# Patient Record
Sex: MALE | Race: WHITE | NOT HISPANIC OR LATINO | Employment: FULL TIME | URBAN - METROPOLITAN AREA
[De-identification: names, ages, dates, MRNs, and addresses within clinical notes are randomized per-mention and may not be internally consistent; named-entity substitution may affect disease eponyms.]

---

## 2017-01-31 ENCOUNTER — OFFICE VISIT (OUTPATIENT)
Dept: FAMILY MEDICINE CLINIC | Facility: CLINIC | Age: 37
End: 2017-01-31

## 2017-01-31 VITALS
WEIGHT: 227 LBS | HEIGHT: 72 IN | TEMPERATURE: 97.7 F | DIASTOLIC BLOOD PRESSURE: 100 MMHG | BODY MASS INDEX: 30.75 KG/M2 | OXYGEN SATURATION: 98 % | SYSTOLIC BLOOD PRESSURE: 150 MMHG | HEART RATE: 91 BPM

## 2017-01-31 DIAGNOSIS — F41.9 ANXIETY AND DEPRESSION: Primary | ICD-10-CM

## 2017-01-31 DIAGNOSIS — G47.26 SHIFT WORK SLEEP DISORDER: ICD-10-CM

## 2017-01-31 DIAGNOSIS — F32.A ANXIETY AND DEPRESSION: Primary | ICD-10-CM

## 2017-01-31 PROCEDURE — 99213 OFFICE O/P EST LOW 20 MIN: CPT | Performed by: NURSE PRACTITIONER

## 2017-01-31 RX ORDER — TRAZODONE HYDROCHLORIDE 50 MG/1
50 TABLET ORAL NIGHTLY PRN
Qty: 30 TABLET | Refills: 0 | Status: SHIPPED | OUTPATIENT
Start: 2017-01-31

## 2017-01-31 RX ORDER — CITALOPRAM 20 MG/1
20 TABLET ORAL DAILY
Qty: 30 TABLET | Refills: 0
Start: 2017-01-31

## 2017-01-31 NOTE — PROGRESS NOTES
"Subjective   Mayo Howell Jr. is a 36 y.o. male who is here for a follow up on depression and anxiety.  Patient said he feels like it is getting worse and he isn't sleeping.  Patient also said he has seen his girls (4) for a little over 2 weeks.     History of Present Illness   Was only needing 1 buspar daily until couldn't see his children. Currently living with dad. Struggling with worry, some anger and sadness. No outbursts at work. Worst symptoms when alone. Trying not to isolate self from people, but notes primary supports from dad, friend and children.     Working 14 hrs days (3rd shift) and side work, having EMA, 2-3 hours after sleep onset. Initiation takes 1 hr.   The following portions of the patient's history were reviewed and updated as appropriate: allergies, current medications, past family history, past medical history, past social history, past surgical history and problem list.    Review of Systems   Constitutional: Positive for activity change and fatigue. Negative for fever and unexpected weight change.   HENT: Negative.    Respiratory: Negative.    Cardiovascular: Negative.    Gastrointestinal: Negative.  Negative for abdominal pain.   Endocrine: Negative for cold intolerance, heat intolerance, polydipsia, polyphagia and polyuria.   Neurological: Negative for seizures, light-headedness and headaches.   Psychiatric/Behavioral: Positive for decreased concentration, dysphoric mood and sleep disturbance. Negative for agitation, confusion, hallucinations, self-injury and suicidal ideas. The patient is nervous/anxious. The patient is not hyperactive.      Visit Vitals   • /100   • Pulse 91   • Temp 97.7 °F (36.5 °C) (Oral)   • Ht 72\" (182.9 cm)   • Wt 227 lb (103 kg)   • SpO2 98%   • BMI 30.79 kg/m2     Objective   Physical Exam   Constitutional: He is oriented to person, place, and time. He appears well-developed and well-nourished.   Eyes: Conjunctivae are normal. Pupils are equal, round, " and reactive to light.   Neck: Normal range of motion. Neck supple. No JVD present. No tracheal deviation present. No thyromegaly present.   Cardiovascular: Normal rate, regular rhythm and normal heart sounds.    Pulmonary/Chest: Effort normal and breath sounds normal.   Abdominal: Soft. There is no tenderness.   Lymphadenopathy:     He has no cervical adenopathy.   Neurological: He is alert and oriented to person, place, and time.   Skin: Skin is warm and dry.   Psychiatric: His speech is normal and behavior is normal. Judgment normal. His mood appears anxious. His affect is not angry, not blunt, not labile and not inappropriate. Cognition and memory are normal. He exhibits a depressed mood. He expresses no homicidal and no suicidal ideation. He expresses no suicidal plans and no homicidal plans.   Nursing note and vitals reviewed.    Assessment/Plan   Problems Addressed this Visit        Other    Anxiety and depression - Primary        Symptoms partially remitted, then split with girlfriend again, needs to negotiate a way to see children. Will increase citalopram, consider further increase to 40mg in 2 weeks if not full remitted. Discussed role in relapse prevention of full remission of symptoms. Consider 6-9 months therapy once remitted. Plan 2 month FU.     Will start trazodone for sleep, only if has adequate time in bed (7hrs).

## 2017-01-31 NOTE — MR AVS SNAPSHOT
Mayo Howell JrMelo   1/31/2017 9:00 AM   Office Visit    Provider:  TRACY Rodriguez   Department:  [facility]   Dept Phone:  [phone]                Your Full Care Plan              Today's Medication Changes          These changes are accurate as of: 1/31/17 10:01 AM.  If you have any questions, ask your nurse or doctor.               New Medication(s)Ordered:     traZODone 50 MG tablet   Commonly known as:  DESYREL   Take 1 tablet by mouth At Night As Needed for sleep.   Started by:  TRACY Rodriguez         Medication(s)that have changed:     citalopram 20 MG tablet   Commonly known as:  CELEXA   Take 1 tablet by mouth Daily.   What changed:    - medication strength  - how much to take   Changed by:  TRACY Rodriguez            Where to Get Your Medications      These medications were sent to [address] [address] [facility] [phone] [facility] [phone]   [address] [facility] [address]     Phone:  [phone]     traZODone 50 MG tablet         Information about where to get these medications is not yet available     ! Ask your nurse or doctor about these medications     citalopram 20 MG tablet                  Your Updated Medication List          This list is accurate as of: 1/31/17 10:01 AM.  Always use your most recent med list.                busPIRone 5 MG tablet   Commonly known as:  BUSPAR   Take 1 tablet by mouth 3 (Three) Times a Day.       citalopram 20 MG tablet   Commonly known as:  CELEXA   Take 1 tablet by mouth Daily.       traZODone 50 MG tablet   Commonly known as:  DESYREL   Take 1 tablet by mouth At Night As Needed for sleep.               You Were Diagnosed With        Codes Comments    Anxiety and depression    -  Primary ICD-10-CM: F41.9, F32.9  ICD-9-CM: 300.00, 311     Shift work sleep disorder     ICD-10-CM: G47.26  ICD-9-CM: 327.36       Instructions     None    Patient  "Instructions History      MyChart Signup     Our records indicate that you have declined [email or web address] signup. If you would like to sign up for [employer], please email [email or web address] or call [phone] to obtain an activation code.             Other Info from Your Visit           Allergies     No Known Allergies      Reason for Visit     Anxiety     Depression           Vital Signs     Blood Pressure Pulse Temperature Height Weight Oxygen Saturation    150/100 91 97.7 °F (36.5 °C) (Oral) 72\" (182.9 cm) 227 lb (103 kg) 98%    Body Mass Index Smoking Status                30.79 kg/m2 Current Every Day Smoker          Problems and Diagnoses Noted     Anxiety and depression    Shift work sleep disorder          "

## 2023-11-29 ENCOUNTER — HOSPITAL ENCOUNTER (EMERGENCY)
Facility: HOSPITAL | Age: 43
Discharge: HOME | End: 2023-11-29
Attending: EMERGENCY MEDICINE
Payer: MEDICAID

## 2023-11-29 VITALS
OXYGEN SATURATION: 97 % | DIASTOLIC BLOOD PRESSURE: 129 MMHG | BODY MASS INDEX: 30.79 KG/M2 | HEIGHT: 72 IN | RESPIRATION RATE: 16 BRPM | TEMPERATURE: 98 F | HEART RATE: 61 BPM | SYSTOLIC BLOOD PRESSURE: 222 MMHG

## 2023-11-29 DIAGNOSIS — S01.81XA FACIAL LACERATION, INITIAL ENCOUNTER: Primary | ICD-10-CM

## 2023-11-29 DIAGNOSIS — R03.0 ELEVATED BLOOD PRESSURE READING: ICD-10-CM

## 2023-11-29 PROCEDURE — 90471 IMMUNIZATION ADMIN: CPT | Performed by: EMERGENCY MEDICINE

## 2023-11-29 PROCEDURE — 90715 TDAP VACCINE 7 YRS/> IM: CPT | Performed by: EMERGENCY MEDICINE

## 2023-11-29 PROCEDURE — 25010000002 TETANUS-DIPHTH-ACELL PERTUSSIS 5-2.5-18.5 LF-MCG/0.5 SUSPENSION PREFILLED SYRINGE: Performed by: EMERGENCY MEDICINE

## 2023-11-29 PROCEDURE — 99282 EMERGENCY DEPT VISIT SF MDM: CPT

## 2023-11-29 RX ORDER — LISINOPRIL 20 MG/1
20 TABLET ORAL DAILY
Qty: 30 TABLET | Refills: 0 | Status: SHIPPED | OUTPATIENT
Start: 2023-11-29

## 2023-11-29 RX ADMIN — TETANUS TOXOID, REDUCED DIPHTHERIA TOXOID AND ACELLULAR PERTUSSIS VACCINE, ADSORBED 0.5 ML: 5; 2.5; 8; 8; 2.5 SUSPENSION INTRAMUSCULAR at 10:18

## 2023-11-29 NOTE — ED PROVIDER NOTES
Time: 10:04 AM EST  Date of encounter:  11/29/2023  Independent Historian/Clinical History and Information was obtained by:   Patient and Family    History is limited by: N/A    Chief Complaint: Facial injury      History of Present Illness:  Patient is a 43 y.o. year old male who presents to the emergency department for evaluation of laceration to the right face.  Patient reports he was carrying wood that inadvertently had nails in it when the board popped up and the nail hit him in the right face.    HPI    Patient Care Team  Primary Care Provider: Provider, No Known    Past Medical History:     No Known Allergies  Past Medical History:   Diagnosis Date    Anxiety     Depression     Hx of mixed drug abuse     Sleep trouble     Stress due to marital problems      Past Surgical History:   Procedure Laterality Date    APPENDECTOMY      FRACTURE SURGERY Left     WRIST     Family History   Family history unknown: Yes   Problem Relation Age of Onset    Family history unknown: Yes    No Known Problems Mother        Home Medications:  Prior to Admission medications    Medication Sig Start Date End Date Taking? Authorizing Provider   busPIRone (BUSPAR) 5 MG tablet Take 1 tablet by mouth 3 (Three) Times a Day.  Patient taking differently: Take 5 mg by mouth 2 (Two) Times a Day. 12/8/16   Kyra Chin APRN   citalopram (CeleXA) 20 MG tablet Take 1 tablet by mouth Daily. 1/31/17   Tiesha Abebe APRN   traZODone (DESYREL) 50 MG tablet Take 1 tablet by mouth At Night As Needed for sleep. 1/31/17   Tiesha Abebe APRN        Social History:   Social History     Tobacco Use    Smoking status: Every Day     Packs/day: 2.00     Years: 25.00     Additional pack years: 0.00     Total pack years: 50.00     Types: Cigarettes    Smokeless tobacco: Never   Substance Use Topics    Alcohol use: Yes     Comment: DAILY    Drug use: Yes     Frequency: 7.0 times per week     Types: Marijuana         Review of Systems:  Review  "of Systems   Constitutional:  Negative for chills and fever.   HENT:  Negative for congestion, rhinorrhea and sore throat.    Eyes:  Negative for pain and visual disturbance.   Respiratory:  Negative for apnea, cough, chest tightness and shortness of breath.    Cardiovascular:  Negative for chest pain and palpitations.   Gastrointestinal:  Negative for abdominal pain, diarrhea, nausea and vomiting.   Genitourinary:  Negative for difficulty urinating and dysuria.   Musculoskeletal:  Negative for joint swelling and myalgias.   Skin:  Negative for color change.   Neurological:  Negative for seizures and headaches.   Psychiatric/Behavioral: Negative.     All other systems reviewed and are negative.       Physical Exam:  /98   Pulse 75   Temp 98 °F (36.7 °C) (Oral)   Resp 16   Ht 182.9 cm (72\")   SpO2 97%   BMI 30.79 kg/m²     Physical Exam  Vitals and nursing note reviewed.   Constitutional:       General: He is not in acute distress.     Appearance: Normal appearance. He is not toxic-appearing.   HENT:      Head: Normocephalic and atraumatic.      Jaw: There is normal jaw occlusion.   Eyes:      General: Lids are normal.      Extraocular Movements: Extraocular movements intact.      Conjunctiva/sclera: Conjunctivae normal.      Pupils: Pupils are equal, round, and reactive to light.   Cardiovascular:      Rate and Rhythm: Normal rate and regular rhythm.      Pulses: Normal pulses.      Heart sounds: Normal heart sounds.   Pulmonary:      Effort: Pulmonary effort is normal. No respiratory distress.      Breath sounds: Normal breath sounds. No wheezing or rhonchi.   Abdominal:      General: Abdomen is flat.      Palpations: Abdomen is soft.      Tenderness: There is no abdominal tenderness. There is no guarding or rebound.   Musculoskeletal:         General: Normal range of motion.      Cervical back: Normal range of motion and neck supple.      Right lower leg: No edema.      Left lower leg: No edema. "   Skin:     General: Skin is warm.      Comments: Laceration right cheek   Neurological:      Mental Status: He is alert and oriented to person, place, and time. Mental status is at baseline.   Psychiatric:         Mood and Affect: Mood normal.                  Procedures:  Laceration Repair    Date/Time: 11/29/2023 10:47 AM    Performed by: Damian Chen PA-C  Authorized by: Kojo Renae MD    Consent:     Consent obtained:  Verbal    Consent given by:  Patient    Risks, benefits, and alternatives were discussed: yes      Risks discussed:  Infection, pain, poor cosmetic result, poor wound healing and need for additional repair    Alternatives discussed:  No treatment  Universal protocol:     Procedure explained and questions answered to patient or proxy's satisfaction: yes      Patient identity confirmed:  Verbally with patient  Anesthesia:     Anesthesia method:  None  Laceration details:     Location:  Face    Face location:  R cheek    Length (cm):  2  Pre-procedure details:     Preparation:  Patient was prepped and draped in usual sterile fashion  Treatment:     Area cleansed with:  Saline    Amount of cleaning:  Standard    Irrigation solution:  Sterile saline  Skin repair:     Repair method:  Steri-Strips and tissue adhesive    Number of Steri-Strips:  2  Approximation:     Approximation:  Close  Repair type:     Repair type:  Simple  Post-procedure details:     Procedure completion:  Tolerated well, no immediate complications        Medical Decision Making:      Comorbidities that affect care:    Anxiety, depression    External Notes reviewed:    None      The following orders were placed and all results were independently analyzed by me:  Orders Placed This Encounter   Procedures    Laceration Repair       Medications Given in the Emergency Department:  Medications   Tetanus-Diphth-Acell Pertussis (BOOSTRIX) injection 0.5 mL (0.5 mL Intramuscular Given 11/29/23 1018)        ED Course:          Labs:    Lab Results (last 24 hours)       ** No results found for the last 24 hours. **             Imaging:    No Radiology Exams Resulted Within Past 24 Hours      Differential Diagnosis and Discussion:    Laceration: Laceration was evaluated for arterial injury, ligamentous damage, and other neurovascular injury.        MDM  Number of Diagnoses or Management Options  Facial laceration, initial encounter  Diagnosis management comments: In summary this is a 43-year-old male patient who presents to the emergency department for evaluation and treatment of laceration to the right face from a nail.  Tetanus has been updated.  Wound was otherwise clean appearing without debris.  Wound has been closed appropriately.  Antihypertensive medication prescription given.  Very strict return to ER and follow-up instructions have been provided to the patient.                   Patient Care Considerations:    CT HEAD: I considered ordering a noncontrast CT of the head, however no loss of consciousness or focal neurologic deficit      Consultants/Shared Management Plan:    None    Social Determinants of Health:    Patient is independent, reliable, and has access to care.       Disposition and Care Coordination:    Discharged: The patient is suitable and stable for discharge with no need for consideration of observation or admission.    I have explained the patient´s condition, diagnoses and treatment plan based on the information available to me at this time. I have answered questions and addressed any concerns. The patient has a good  understanding of the patient´s diagnosis, condition, and treatment plan as can be expected at this point. The vital signs have been stable. The patient´s condition is stable and appropriate for discharge from the emergency department.      The patient will pursue further outpatient evaluation with the primary care physician or other designated or consulting physician as outlined in the discharge  instructions. They are agreeable to this plan of care and follow-up instructions have been explained in detail. The patient has received these instructions in written format and have expressed an understanding of the discharge instructions. The patient is aware that any significant change in condition or worsening of symptoms should prompt an immediate return to this or the closest emergency department or call to 911.  I have explained discharge medications and the need for follow up with the patient/caretakers. This was also printed in the discharge instructions. Patient was discharged with the following medications and follow up:      Medication List        New Prescriptions      lisinopril 20 MG tablet  Commonly known as: PRINIVIL,ZESTRIL  Take 1 tablet by mouth Daily.            Changed      busPIRone 5 MG tablet  Commonly known as: BUSPAR  Take 1 tablet by mouth 3 (Three) Times a Day.  What changed: when to take this               Where to Get Your Medications        Information about where to get these medications is not yet available    Ask your nurse or doctor about these medications  lisinopril 20 MG tablet      Provider, No Known  [facility] [id number]    In 1 week         Final diagnoses:   Facial laceration, initial encounter   Elevated blood pressure reading        ED Disposition       ED Disposition   Discharge    Condition   Stable    Comment   --               This medical record created using voice recognition software.             Kojo Renae MD  11/29/23 [id number]